# Patient Record
Sex: FEMALE | Race: WHITE | URBAN - METROPOLITAN AREA
[De-identification: names, ages, dates, MRNs, and addresses within clinical notes are randomized per-mention and may not be internally consistent; named-entity substitution may affect disease eponyms.]

---

## 2018-02-27 ENCOUNTER — TRANSFERRED RECORDS (OUTPATIENT)
Dept: HEALTH INFORMATION MANAGEMENT | Facility: CLINIC | Age: 38
End: 2018-02-27

## 2018-02-28 ENCOUNTER — MEDICAL CORRESPONDENCE (OUTPATIENT)
Dept: HEALTH INFORMATION MANAGEMENT | Facility: CLINIC | Age: 38
End: 2018-02-28

## 2018-03-07 ENCOUNTER — CARE COORDINATION (OUTPATIENT)
Dept: SURGERY | Facility: CLINIC | Age: 38
End: 2018-03-07

## 2018-03-07 NOTE — PROGRESS NOTES
Received a referral from Harvinder regarding patient who has a diagnosis from CT of lymphangioma. She is scheduled with Dr. Ybarra 3/16. Dr. Ybarra has reviewed her records and is unsure if a visit is necessary, as nothing is typically done with this diagnosis. He would like to review patients imaging (which has been mailed) prior to the appointment. She may need an IR visit depending on the findings. Called and relayed this information to patient. She is aware the office will notify her with recommendations once the CT has been reviewed.

## 2018-03-09 ENCOUNTER — CARE COORDINATION (OUTPATIENT)
Dept: SURGERY | Facility: CLINIC | Age: 38
End: 2018-03-09

## 2018-03-09 NOTE — PROGRESS NOTES
Called and spoke with patient and informed her that the clinic has not received her imaging. RN has called [facility] and they will Fed Ex a new copy of the Ct. Once this has been reviewed by Dr. Ybarra, she will be called with recommendations. She was appreciative of the call.      Marleni Castañeda Andrea, PATRIZIA        Phone Number: [phone]                     Pavan Beasley,     Patient called wondering if there is an update on reviewing CT Scan results. Have you guys received it yet even? She is hoping to speak with you before the weekend at [phone].     Thanks!   Marleni   [facility] Referrals   [phone]

## 2018-03-12 ENCOUNTER — CARE COORDINATION (OUTPATIENT)
Dept: SURGERY | Facility: CLINIC | Age: 38
End: 2018-03-12

## 2018-03-12 NOTE — PROGRESS NOTES
Dr. Ybarra was able to review patients CT. He states that he is happy to see her but more than likely there is no surgical intervention that he could offer. He states he can speak with IR to see if they can perform sclerotherapy on the area. Sent a message to IR to see the best way for them to review patient images. Called and relayed this information to patient. She would like to cancel her appointment with Dr. Ybarra if he does not feel she is a surgical candidate. She is aware she will either hear back from RN or from IR regarding any further instructions.

## 2018-03-20 ENCOUNTER — CARE COORDINATION (OUTPATIENT)
Dept: SURGERY | Facility: CLINIC | Age: 38
End: 2018-03-20

## 2018-03-20 NOTE — PROGRESS NOTES
Called and spoke with patient and informed her that IR is still reviewing her chart. Provided patient with the IR nurse number so she can call directly for a status update. Patient was appreciative of the information.         From: Roseanne Roger RN      Sent: 3/19/2018   8:10 AM        To: Ramos Plata RN   Subject: IR case                                           Ramos,     This patient left a VM message regarding IR.  She has not yet heard from them and is wondering the status.       Thanks,   Roseanne

## 2018-03-23 ENCOUNTER — TELEPHONE (OUTPATIENT)
Dept: INTERVENTIONAL RADIOLOGY/VASCULAR | Facility: CLINIC | Age: 38
End: 2018-03-23

## 2018-03-23 DIAGNOSIS — Q27.9 VASCULAR MALFORMATION: Primary | ICD-10-CM

## 2018-03-23 NOTE — TELEPHONE ENCOUNTER
I called and left a voicemail including my call back number.  Per Dr. Martínez's request pt needs MRV prior to clinic visit.  I will schedule per pt's preference when she returns my call.  WYATT Jose RN, BSN  Interventional Radiology Care Coordinator   Phone:  [phone]

## 2018-03-23 NOTE — TELEPHONE ENCOUNTER
I called and left a vm to sched pt for MRV and to see Dr. Martínez.  She is [place] per her preference [place] Wed April [place]th [place] [facility] [place]. Martínez at 9 am.  Pt updated and all questions answered.  WYATT Jose RN, BSN  Interventional Radiology Care Coordinator   Phone:  [phone]

## 2018-03-23 NOTE — TELEPHONE ENCOUNTER
Pt scheduled for MRI and clinic consult per her preference.  A letter and map was mailed with appt details.  WYATT Jose RN, BSN  Interventional Radiology Care Coordinator   Phone:  [phone]

## 2018-04-25 ENCOUNTER — RADIANT APPOINTMENT (OUTPATIENT)
Dept: MRI IMAGING | Facility: CLINIC | Age: 38
End: 2018-04-25
Attending: RADIOLOGY
Payer: COMMERCIAL

## 2018-04-25 ENCOUNTER — OFFICE VISIT (OUTPATIENT)
Dept: RADIOLOGY | Facility: CLINIC | Age: 38
End: 2018-04-25
Attending: RADIOLOGY
Payer: COMMERCIAL

## 2018-04-25 VITALS
HEIGHT: 65 IN | HEART RATE: 82 BPM | DIASTOLIC BLOOD PRESSURE: 71 MMHG | TEMPERATURE: 98.1 F | SYSTOLIC BLOOD PRESSURE: 125 MMHG | BODY MASS INDEX: 28.91 KG/M2 | OXYGEN SATURATION: 98 % | WEIGHT: 173.5 LBS | RESPIRATION RATE: 16 BRPM

## 2018-04-25 DIAGNOSIS — Q89.9 LYMPHATIC MALFORMATION: Primary | ICD-10-CM

## 2018-04-25 DIAGNOSIS — Q27.9 VASCULAR MALFORMATION: ICD-10-CM

## 2018-04-25 PROCEDURE — G0463 HOSPITAL OUTPT CLINIC VISIT: HCPCS | Mod: ZF

## 2018-04-25 RX ORDER — GADOBUTROL 604.72 MG/ML
7.5 INJECTION INTRAVENOUS ONCE
Status: COMPLETED | OUTPATIENT
Start: 2018-04-25 | End: 2018-04-25

## 2018-04-25 RX ADMIN — GADOBUTROL 7.5 ML: 604.72 INJECTION INTRAVENOUS at 07:44

## 2018-04-25 ASSESSMENT — PAIN SCALES - GENERAL: PAINLEVEL: NO PAIN (0)

## 2018-04-25 NOTE — LETTER
4/25/2018       RE: Camila Reynolds  [address] [address]     Dear Colleague,    Thank you for referring your patient, Camila Reynolds, to the [facility]. Please see a copy of my visit note below.        INTERVENTIONAL RADIOLOGY CONSULTATION    Name: Camila Reynolds  Age: 37 year old   Referring Physician: Dr. Lu   REASON FOR REFERRAL: vascular malformation    HPI: Mrs. Reynolds is referred by Dr. 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 Her  is with her in clinic today. She was diagnosed as a young child with a vascular lesion affecting the right inguinal region. She underwent resection in approximately 1985 in [place]. She had subsequently done well until the st few years t which time she noticed it has enlarged in the context of pregnancy. Given recurrence of her lesion, surgery was declined. She notes the area overlying the right groin has enlarged to the point that it cases pressure and aching on a daily basis. The discomfort is worsened by physical activity and prolonged standing. Swelling and pain only mildly improves with rest and OTC pain med. No leg swelling, dyspnea, lesions elsewhere on the body, or family history of vascular malformations. ROS otherwise negative.    PAST MEDICAL HISTORY:   No past medical history on file.    PAST SURGICAL HISTORY:   No past surgical history on file.    FAMILY HISTORY:   No family history on file.    SOCIAL HISTORY:   Social History   Substance Use Topics     Smoking status: Never Smoker     Smokeless tobacco: Never Used     Alcohol use Not on file       PROBLEM LIST:   There are no active problems to display for this patient.      MEDICATIONS:   Prescription Medications as of 4/25/2018             order for DME Compression garment measuring from toes all the way up to waist  20-30mmhg    UNABLE TO FIND MEDICATION NAME: birth control (pre-appri)      Facility Administered  "Medications as of 4/25/2018             gadobenate dimeglumine (MULTIHANCE) injection 15 mL Inject 15 mLs into the vein once    gadobutrol (GADAVIST) injection 7.5 mL Inject 7.5 mLs into the vein once          ALLERGIES:   Review of patient's allergies indicates no known allergies.    ROS:  Skin: as above  Eyes: neg  Ears/Nose/Throat: negative  Respiratory: No shortness of breath, cough  Cardiovascular: negative  Gastrointestinal: negative  Musculoskeletal: as above  Neurologic: negative  Psychiatric: anxiety regarding the right groin lesion  Hematologic/Lymphatic/Immunologic: negative      Physical Examination:   VITALS:   /71 (BP Location: Right arm, Patient Position: Chair, Cuff Size: Adult Regular)  Pulse 82  Temp 98.1  F (36.7  C) (Oral)  Resp 16  Ht 1.651 m (5' 5\")  Wt 78.7 kg (173 lb 8 oz)  LMP 04/25/2018  SpO2 98%  BMI 28.87 kg/m2  Constitutional: Healthy, AAO, NAD  Head: Normocephalic.   ENT: Oropharynx clear  Cardiovascular: RRR. No murmur  Respiratory: CTAB  Gastrointestinal: Abdomen soft, non-tender.   : deferred  Musculoskeletal: Healed scar paralleling the right inguinal crease. Prominent blue vessels overlying a 15 cm, soft, compressible lesion right groin. No pulsatility.  Skin: No other vascula stains  Psychiatric: Becomes tearful while discussing her right groin lesion  Vascular: No lower extremity edema    Labs:    BMP RESULTS:  No results found for: NA, POTASSIUM, CHLORIDE, CO2, ANIONGAP, GLC, BUN, CR, GFRESTIMATED, GFRESTBLACK, ROSSY     CBC RESULTS:  No results found for: WBC, RBC, HGB, HCT, MCV, MCH, MCHC, RDW, PLT    INR/PTT:  No results found for: INR, PTT    Diagnostic studies:   MRI 4/25 reviewed by me. Multicystic, T2 hyper intense lesion in the subcutaneous tissues of the right groin. No internal enhancement. No osseous or muscular involvement. C/s macrocystic LM.    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 She is an appropriate candidate for sclerotherapy. I discussed the lifetime nature of these lesions, which can recur following attempted resection, as hers did. The purpose of sclerotherapy is to control symptoms, but it not a cure. She will likely need additional treatments throughout her lifetime. I explained the procedure, including the risks of soft tissue injury, skin ulceration, and disruption of normal lymphatics, all of which are small. She would like to proceed with sclerotherapy. In addition, she should avoid estrogen containing oral contraceptives. A progesterone only option would be better should she need to be on chronic OCPs. I consulted our [facility] gynecologist, Dr. Bhakta, who recommends Nor Q D (aka Aygestin, Florinda, Lyza, Jencycla, Jolivette, Deblitane, Sharobel, Norlyda, Norlyroc, Demetra-BE) one daily.     It was a pleasure to see Mrs. Reynolds and her  in clinic today. Thank you for involving the Interventional Radiology service in her care.    I spent a total of 35 minutes with this patient, over 50% time was for counseling and care coordination.    Angela Martínez MD  Interventional Radiology Attending  [facility]       CC  Patient Care Team:  [facility], [facility] as PCP - Linda Guzman as Referring Physician (Surgery)  Angela Martínez MD as MD (Radiology)  Payton Jose RN as Registered Nurse (Transplant)

## 2018-04-25 NOTE — PROGRESS NOTES
INTERVENTIONAL RADIOLOGY CONSULTATION    Name: Camila Reynolds  Age: 37 year old   Referring Physician: Dr. Lu   REASON FOR REFERRAL: vascular malformation    HPI: Mrs. Reynolds is referred by Dr. 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 Her  is with her in clinic today. She was diagnosed as a young child with a vascular lesion affecting the right inguinal region. She underwent resection in approximately 1985 in [place]. She had subsequently done well until the lsst few years t which time she noticed it has enlarged in the context of pregnancy. Given recurrence of her lesion, surgery was declined. She notes the area overlying the right groin has enlarged to the point that it cases pressure and aching on a daily basis. The discomfort is worsened by physical activity and prolonged standing. Swelling and pain only mildly improves with rest and OTC pain med. No leg swelling, dyspnea, lesions elsewhere on the body, or family history of vascular malformations. ROS otherwise negative.    PAST MEDICAL HISTORY:   No past medical history on file.    PAST SURGICAL HISTORY:   No past surgical history on file.    FAMILY HISTORY:   No family history on file.    SOCIAL HISTORY:   Social History   Substance Use Topics     Smoking status: Never Smoker     Smokeless tobacco: Never Used     Alcohol use Not on file       PROBLEM LIST:   There are no active problems to display for this patient.      MEDICATIONS:   Prescription Medications as of 4/25/2018             order for DME Compression garment measuring from toes all the way up to waist  20-30mmhg    UNABLE TO FIND MEDICATION NAME: birth control (pre-appri)      Facility Administered Medications as of 4/25/2018             gadobenate dimeglumine (MULTIHANCE) injection 15 mL Inject 15 mLs into the vein once    gadobutrol (GADAVIST) injection 7.5 mL Inject 7.5 mLs into the vein once          ALLERGIES:   Review of patient's  "allergies indicates no known allergies.    ROS:  Skin: as above  Eyes: neg  Ears/Nose/Throat: negative  Respiratory: No shortness of breath, cough  Cardiovascular: negative  Gastrointestinal: negative  Musculoskeletal: as above  Neurologic: negative  Psychiatric: anxiety regarding the right groin lesion  Hematologic/Lymphatic/Immunologic: negative      Physical Examination:   VITALS:   /71 (BP Location: Right arm, Patient Position: Chair, Cuff Size: Adult Regular)  Pulse 82  Temp 98.1  F (36.7  C) (Oral)  Resp 16  Ht 1.651 m (5' 5\")  Wt 78.7 kg (173 lb 8 oz)  LMP 04/25/2018  SpO2 98%  BMI 28.87 kg/m2  Constitutional: Healthy, AAO, NAD  Head: Normocephalic.   ENT: Oropharynx clear  Cardiovascular: RRR. No murmur  Respiratory: CTAB  Gastrointestinal: Abdomen soft, non-tender.   : deferred  Musculoskeletal: Healed scar paralleling the right inguinal crease. Prominent blue vessels overlying a 15 cm, soft, compressible lesion right groin. No pulsatility.  Skin: No other vascula stains  Psychiatric: Becomes tearful while discussing her right groin lesion  Vascular: No lower extremity edema    Labs:    BMP RESULTS:  No results found for: NA, POTASSIUM, CHLORIDE, CO2, ANIONGAP, GLC, BUN, CR, GFRESTIMATED, GFRESTBLACK, ROSSY     CBC RESULTS:  No results found for: WBC, RBC, HGB, HCT, MCV, MCH, MCHC, RDW, PLT    INR/PTT:  No results found for: INR, PTT    Diagnostic studies:   MRI 4/25 reviewed by me. Multicystic, T2 hyper intense lesion in the subcutaneous tissues of the right groin. No internal enhancement. No osseous or muscular involvement. C/s macrocystic LM.    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 She is an appropriate candidate for sclerotherapy. I discussed the lifetime nature of these lesions, which can recur following attempted resection, as hers did. The purpose " of sclerotherapy is to control symptoms, but it not a cure. She will likely need additional treatments throughout her lifetime. I explained the procedure, including the risks of soft tissue injury, skin ulceration, and disruption of normal lymphatics, all of which are small. She would like to proceed with sclerotherapy. In addition, she should avoid estrogen containing oral contraceptives. A progesterone only option would be better should she need to be on chronic OCPs. I consulted our [facility] gynecologist, Dr. Bhakta, who recommends Nor Q D (aka Aygestin, Florinda, Lyza, Jencycla, Jolivette, Deblitane, Sharobel, Norlyda, Norlyroc, Demetra-BE) one daily.     It was a pleasure to see Mrs. Reynolds and her  in clinic today. Thank you for involving the Interventional Radiology service in her care.    I spent a total of 35 minutes with this patient, over 50% time was for counseling and care coordination.    Angela Martínez MD  Interventional Radiology Attending  [facility]       CC  Patient Care Team:  [facility] as PCP - General  Vishnu Lu as Referring Physician (Surgery)  Angela Martínez MD as MD (Radiology)  Payton Jose, RN as Registered Nurse (Transplant)  VISHNU LU

## 2018-04-25 NOTE — PATIENT INSTRUCTIONS
We will call you with the appt details of your procedure.    We may also need to obtain a Prior Authorization regarding this treatment procedure which may take up to 14 days as well. Once obtained then we will all you with that information and move forward to schedule.     We will also then send a script for you to obtain your compression garment as well.       It was a pleasure to see you in clinic today   Should you have any other questions please feel free to call us any time.     Lillie Carpenter RN, BSN  Interventional Radiology Nurse Coordinator   Phone: [phone]      Covering for Carlee Lennon RN   [phone]

## 2018-06-04 ENCOUNTER — TELEPHONE (OUTPATIENT)
Dept: INTERVENTIONAL RADIOLOGY/VASCULAR | Facility: CLINIC | Age: 38
End: 2018-06-04

## 2018-06-04 DIAGNOSIS — Q27.9 VASCULAR MALFORMATION: Primary | ICD-10-CM

## 2018-06-04 NOTE — LETTER
June 4, 2018    Camila Reynolds  [address] [place] [address]    Dear Camila,     Monday, July 16th @ 8:30 am, please check in at 7:00 am.  Please check-in to the gold waiting room at the [facility], located at [address] [place] [address]. They do have  parking.    *Please have nothing to eat for 6 hours prior to the procedure time   *You may have clear liquids (black coffee, water, broth) up to 2 hours prior to the procedure time  *You will need a ride home after the procedure  *Plan to spend an estimated 6 hours in the hospital on the day of your procedure    I will call you after your procedure to see how you are doing, and to schedule any follow up that is recommended by Dr. Martínez.    SHAILESH:  Sclerotherapy is a non-surgical procedure that uses ultrasound guidance to treat abnormal vessels (vascular malformations). This procedure can be used on abnormal vessels in many parts of the body. While you are under sedation, Dr. Martínez will inject a chemical (sclerosant) into the abnormal vessels that causes then to clot and become inflamed and irritated. This process causes pain and swelling in the treatment area, but the intent is the treated vessels will no longer fill with blood/fluid and scar down causing shrinkage in the vascular malformation and symptom improvement. This is rarely curative, but does improve symptoms. Lesions may require multiple treatments to achieve good symptom control. After the treatment, you need to be prepared to rest (no vigorous activity x 5 days) and you may need to use crutches if the malformation is in the leg. You will also have to keep a compression dressing applied to the site. Typically, pain is worst from day 1 to day 5, then gradually improves. Pain is typically well controlled with Ibuprofen only, but additional pain medications can be provided if needed.     Please feel free to call me with any questions or concerns as your  procedure approaches.    Carlee Jose RN, BSN  Interventional Radiology Care Coordinator  Office: [phone]

## 2018-07-12 ENCOUNTER — TELEPHONE (OUTPATIENT)
Dept: INTERVENTIONAL RADIOLOGY/VASCULAR | Facility: CLINIC | Age: 38
End: 2018-07-12

## 2018-07-16 ENCOUNTER — APPOINTMENT (OUTPATIENT)
Dept: INTERVENTIONAL RADIOLOGY/VASCULAR | Facility: CLINIC | Age: 38
End: 2018-07-16
Attending: RADIOLOGY
Payer: COMMERCIAL

## 2018-07-16 ENCOUNTER — HOSPITAL ENCOUNTER (OUTPATIENT)
Facility: CLINIC | Age: 38
Discharge: HOME | End: 2018-07-16
Attending: RADIOLOGY | Admitting: RADIOLOGY
Payer: COMMERCIAL

## 2018-07-16 ENCOUNTER — APPOINTMENT (OUTPATIENT)
Dept: MEDSURG UNIT | Facility: CLINIC | Age: 38
End: 2018-07-16
Attending: RADIOLOGY
Payer: COMMERCIAL

## 2018-07-16 VITALS
DIASTOLIC BLOOD PRESSURE: 73 MMHG | OXYGEN SATURATION: 99 % | BODY MASS INDEX: 28.91 KG/M2 | HEART RATE: 74 BPM | WEIGHT: 173.5 LBS | HEIGHT: 65 IN | RESPIRATION RATE: 16 BRPM | TEMPERATURE: 98.2 F | SYSTOLIC BLOOD PRESSURE: 119 MMHG

## 2018-07-16 DIAGNOSIS — Q27.9 VASCULAR MALFORMATION: ICD-10-CM

## 2018-07-16 LAB
ANION GAP SERPL CALCULATED.3IONS-SCNC: 8 MMOL/L (ref 3–14)
B-HCG SERPL-ACNC: <1 IU/L (ref 0–5)
BUN SERPL-MCNC: 19 MG/DL (ref 7–30)
CALCIUM SERPL-MCNC: 8.4 MG/DL (ref 8.5–10.1)
CHLORIDE SERPL-SCNC: 110 MMOL/L (ref 94–109)
CO2 SERPL-SCNC: 23 MMOL/L (ref 20–32)
CREAT SERPL-MCNC: 0.82 MG/DL (ref 0.52–1.04)
ERYTHROCYTE [DISTWIDTH] IN BLOOD BY AUTOMATED COUNT: 12.8 % (ref 10–15)
GFR SERPL CREATININE-BSD FRML MDRD: 78 ML/MIN/1.7M2
GLUCOSE SERPL-MCNC: 84 MG/DL (ref 70–99)
HCT VFR BLD AUTO: 38.5 % (ref 35–47)
HGB BLD-MCNC: 12.7 G/DL (ref 11.7–15.7)
INR PPP: 0.99 (ref 0.86–1.14)
MCH RBC QN AUTO: 28.7 PG (ref 26.5–33)
MCHC RBC AUTO-ENTMCNC: 33 G/DL (ref 31.5–36.5)
MCV RBC AUTO: 87 FL (ref 78–100)
PLATELET # BLD AUTO: 199 10E9/L (ref 150–450)
POTASSIUM SERPL-SCNC: 3.8 MMOL/L (ref 3.4–5.3)
RBC # BLD AUTO: 4.43 10E12/L (ref 3.8–5.2)
SODIUM SERPL-SCNC: 141 MMOL/L (ref 133–144)
WBC # BLD AUTO: 5.8 10E9/L (ref 4–11)

## 2018-07-16 PROCEDURE — 25000125 ZZHC RX 250: Performed by: RADIOLOGY

## 2018-07-16 PROCEDURE — 85610 PROTHROMBIN TIME: CPT | Performed by: RADIOLOGY

## 2018-07-16 PROCEDURE — 80048 BASIC METABOLIC PNL TOTAL CA: CPT | Performed by: RADIOLOGY

## 2018-07-16 PROCEDURE — 27210732 ZZH ACCESSORY CR1

## 2018-07-16 PROCEDURE — 85027 COMPLETE CBC AUTOMATED: CPT | Performed by: RADIOLOGY

## 2018-07-16 PROCEDURE — 99153 MOD SED SAME PHYS/QHP EA: CPT

## 2018-07-16 PROCEDURE — 25000128 H RX IP 250 OP 636: Performed by: PHYSICIAN ASSISTANT

## 2018-07-16 PROCEDURE — 25000128 H RX IP 250 OP 636: Performed by: RADIOLOGY

## 2018-07-16 PROCEDURE — 40000166 ZZH STATISTIC PP CARE STAGE 1

## 2018-07-16 PROCEDURE — 37241 VASC EMBOLIZE/OCCLUDE VENOUS: CPT

## 2018-07-16 PROCEDURE — 84702 CHORIONIC GONADOTROPIN TEST: CPT | Performed by: RADIOLOGY

## 2018-07-16 PROCEDURE — 27210903 ZZH KIT CR5

## 2018-07-16 PROCEDURE — 99152 MOD SED SAME PHYS/QHP 5/>YRS: CPT

## 2018-07-16 RX ORDER — OXYCODONE HYDROCHLORIDE 5 MG/1
5-10 TABLET ORAL EVERY 4 HOURS PRN
Qty: 10 TABLET | Refills: 0 | Status: SHIPPED | OUTPATIENT
Start: 2018-07-16

## 2018-07-16 RX ORDER — DOXYCYCLINE 100 MG/10ML
50 INJECTION, POWDER, LYOPHILIZED, FOR SOLUTION INTRAVENOUS ONCE
Status: COMPLETED | OUTPATIENT
Start: 2018-07-16 | End: 2018-07-16

## 2018-07-16 RX ORDER — CEFAZOLIN SODIUM 2 G/100ML
2 INJECTION, SOLUTION INTRAVENOUS
Status: COMPLETED | OUTPATIENT
Start: 2018-07-16 | End: 2018-07-16

## 2018-07-16 RX ORDER — IODIXANOL 320 MG/ML
50 INJECTION, SOLUTION INTRAVASCULAR ONCE
Status: COMPLETED | OUTPATIENT
Start: 2018-07-16 | End: 2018-07-16

## 2018-07-16 RX ORDER — SODIUM CHLORIDE 9 MG/ML
INJECTION, SOLUTION INTRAVENOUS CONTINUOUS
Status: DISCONTINUED | OUTPATIENT
Start: 2018-07-16 | End: 2018-07-16 | Stop reason: HOSPADM

## 2018-07-16 RX ORDER — NALOXONE HYDROCHLORIDE 0.4 MG/ML
.1-.4 INJECTION, SOLUTION INTRAMUSCULAR; INTRAVENOUS; SUBCUTANEOUS
Status: DISCONTINUED | OUTPATIENT
Start: 2018-07-16 | End: 2018-07-16 | Stop reason: HOSPADM

## 2018-07-16 RX ORDER — FLUMAZENIL 0.1 MG/ML
0.2 INJECTION, SOLUTION INTRAVENOUS
Status: DISCONTINUED | OUTPATIENT
Start: 2018-07-16 | End: 2018-07-16 | Stop reason: HOSPADM

## 2018-07-16 RX ORDER — LIDOCAINE 40 MG/G
CREAM TOPICAL
Status: DISCONTINUED | OUTPATIENT
Start: 2018-07-16 | End: 2018-07-16 | Stop reason: HOSPADM

## 2018-07-16 RX ORDER — FENTANYL CITRATE 50 UG/ML
25-50 INJECTION, SOLUTION INTRAMUSCULAR; INTRAVENOUS EVERY 5 MIN PRN
Status: DISCONTINUED | OUTPATIENT
Start: 2018-07-16 | End: 2018-07-16 | Stop reason: HOSPADM

## 2018-07-16 RX ORDER — KETOROLAC TROMETHAMINE 30 MG/ML
15 INJECTION, SOLUTION INTRAMUSCULAR; INTRAVENOUS EVERY 6 HOURS PRN
Status: DISCONTINUED | OUTPATIENT
Start: 2018-07-16 | End: 2018-07-16 | Stop reason: HOSPADM

## 2018-07-16 RX ADMIN — MIDAZOLAM HYDROCHLORIDE 2 MG: 2 INJECTION, SOLUTION INTRAMUSCULAR; INTRAVENOUS at 09:48

## 2018-07-16 RX ADMIN — DOXYCYCLINE 50 ML: 100 INJECTION, POWDER, LYOPHILIZED, FOR SOLUTION INTRAVENOUS at 09:48

## 2018-07-16 RX ADMIN — CEFAZOLIN SODIUM 2 G: 2 INJECTION, SOLUTION INTRAVENOUS at 07:51

## 2018-07-16 RX ADMIN — FENTANYL CITRATE 50 MCG: 50 INJECTION, SOLUTION INTRAMUSCULAR; INTRAVENOUS at 10:11

## 2018-07-16 RX ADMIN — MIDAZOLAM HYDROCHLORIDE 1 MG: 2 INJECTION, SOLUTION INTRAMUSCULAR; INTRAVENOUS at 10:11

## 2018-07-16 RX ADMIN — KETOROLAC TROMETHAMINE 15 MG: 30 INJECTION, SOLUTION INTRAMUSCULAR at 10:11

## 2018-07-16 RX ADMIN — FENTANYL CITRATE 100 MCG: 50 INJECTION, SOLUTION INTRAMUSCULAR; INTRAVENOUS at 09:47

## 2018-07-16 RX ADMIN — IODIXANOL 8 ML: 320 INJECTION, SOLUTION INTRAVASCULAR at 10:22

## 2018-07-16 RX ADMIN — LIDOCAINE HYDROCHLORIDE 5 ML: 10 INJECTION, SOLUTION EPIDURAL; INFILTRATION; INTRACAUDAL; PERINEURAL at 09:49

## 2018-07-16 RX ADMIN — SODIUM CHLORIDE: 9 INJECTION, SOLUTION INTRAVENOUS at 07:50

## 2018-07-16 NOTE — DISCHARGE INSTRUCTIONS
[facility]  Going Home after Sedation      For 24 hours:    An adult should stay with you.    Relax and take it easy.    DO NOT make any important legal decisions.    DO NOT drive or operate machines at home or at work.    Resume your regular diet and drink plenty of fluids.    CALL THE PHYSICIAN IF:    You develop nausea or vomiting    You develop hives or a rash or any unexplained itching    No tub baths or swimming for 5 days, you may shower tomorrow.    No heavy lifting for 1 week.    ADDITIONAL INSTRUCTIONS:Ice to the site every four hours as needed for pain or swelling    [facility] INTERVENTIONAL RADIOLOGY DEPARTMENT        Procedure Physician:  MATILDE Martínez MD                               Date of procedure:July 16, 2018        Telephone numbers:     [phone]      Monday-Friday 8:00 am to 4:30 pm                                              [phone]       After 4:30 pm Monday-Friday, Weekends & Holidays. Ask for the Interventional Radiologist on call. Someone is  available 24 hours/day        [facility] toll free number: [phone]  Monday-Friday 8:00 am to 4:30 pm

## 2018-07-16 NOTE — H&P
H&P  Genereal: Alert, oriented  Lungs:Clear to auscultation  Heart: RRR, no murmurs or gallops  Abdomen:Soft, nontender

## 2018-07-16 NOTE — PROGRESS NOTES
Patient tolerated recovery stage well. VSS, R groin site clean/dry/intact, no hematoma, and denies pain at this time. Ice pack to the site. Patient tolerated PO food and fluids. Teaching was done and discharge instructions were given. Patient ambulated, voided, and PIV was removed. Patient discharged from the hospital via wheel chair to home with .

## 2018-07-16 NOTE — PROGRESS NOTES
Interventional Radiology Brief Post Procedure Note    Procedure:     Proceduralist: Dr. Martínez    Assistant: IR Fellow Physician, Jerson Stevenson and None    Time Out: Prior to the start of the procedure and with procedural staff participation, I verbally confirmed the patient s identity using two indicators, relevant allergies, that the procedure was appropriate and matched the consent or emergent situation, and that the correct equipment/implants were available. Immediately prior to starting the procedure I conducted the Time Out with the procedural staff and re-confirmed the patient s name, procedure, and site/side. (The Joint Commission universal protocol was followed.)  Yes        Sedation: IR Nurse Monitored Care   Post Procedure Summary:  Prior to the start of the procedure and with procedural staff participation, I verbally confirmed the patient s identity using two indicators, relevant allergies, that the procedure was appropriate and matched the consent or emergent situation, and that the correct equipment/implants were available. Immediately prior to starting the procedure I conducted the Time Out with the procedural staff and re-confirmed the patient s name, procedure, and site/side. (The Joint Commission universal protocol was followed.)  Yes       Sedatives: Fentanyl and Midazolam (Versed)    Vital signs, airway and pulse oximetry were monitored and remained stable throughout the procedure and sedation was maintained until the procedure was complete.  The patient was monitored by staff until sedation discharge criteria were met.    Patient tolerance: Patient tolerated the procedure well with no immediate complications.    Time of sedation in minutes: 60 Minutes minutes from beginning to end of physician one to one monitoring.          Findings: Successful sclerotherapy for right inguinal lymphatic malformation.    Estimated Blood Loss: None    Fluoroscopy Time:  minute(s)    SPECIMENS:  None    Complications: 1. None     Condition: Stable    Plan: Send to 2A to observe for 2 hours    Comments: See dictated procedure note for full details.    Jerson Stevenson MD

## 2018-07-16 NOTE — PROGRESS NOTES
Interventional Radiology Pre-Procedure Sedation Assessment   Time of Assessment: 7:54 AM    Expected Level: Moderate Sedation    Indication: Sedation is required for the following type of Procedure: Lymphatic malformation sclerotherapy    Sedation and procedural consent: Risks, benefits and alternatives were discussed with Patient    PO Intake: Appropriately NPO for procedure    ASA Class: Class 1 - HEALTHY PATIENT    Mallampati: Grade 1:  Soft palate, uvula, tonsillar pillars, and posterior pharyngeal wall visible    Lungs: Lungs Clear with good breath sounds bilaterally    Heart: Normal heart sounds and rate    Focused history and physical completed prior to procedure. I have reviewed the lab findings, diagnostic data, medications, and the plan for sedation. I have determined this patient to be an appropriate candidate for the planned sedation and procedure and have reassessed the patient IMMEDIATELY PRIOR to sedation and procedure.    Jerson Stevenson MD

## 2018-07-16 NOTE — IP AVS SNAPSHOT
[address] [address]                                       After Visit Summary   7/16/2018    Camila Reynolds    MRN: [id number]           After Visit Summary Signature Page     I have received my discharge instructions, and my questions have been answered. I have discussed any challenges I see with this plan with the nurse or doctor.    ..........................................................................................................................................  Patient/Patient Representative Signature      ..........................................................................................................................................  Patient Representative Print Name and Relationship to Patient    ..................................................               ................................................  Date                                            Time    ..........................................................................................................................................  Reviewed by Signature/Title    ...................................................              ..............................................  Date                                                            Time

## 2018-07-16 NOTE — PROGRESS NOTES
Pt arrived to floor from IR with RN s/p sclerotherapy of right groin. VSS  Right groin site CDI, no hematoma. Pt denies pain.  at bedside. Plan dc @ noon per MD orders.

## 2018-07-16 NOTE — PROGRESS NOTES
Patient is ready for the procedure, here or sclerotherapy, Blood work pending.  with her Consent signed.

## 2018-07-16 NOTE — IP AVS SNAPSHOT
MRN:[id number]                      After Visit Summary   7/16/2018    Camila Reynolds    MRN: [id number]           Visit Information        Department      7/16/2018  7:03 AM [address] [facility] [place]          Review of your medicines      UNREVIEWED medicines. Ask your doctor about these medicines        Dose / Directions    IBUPROFEN PO        Dose:  400 mg   Take 400 mg by mouth every 6 hours as needed for moderate pain   Refills:  0       UNABLE TO FIND        MEDICATION NAME: birth control (pre-appri)   Refills:  0         START taking        Dose / Directions    oxyCODONE IR 5 MG tablet   Commonly known as:  ROXICODONE   Used for:  Vascular malformation        Dose:  5-10 mg   Take 1-2 tablets (5-10 mg) by mouth every 4 hours as needed for moderate to severe pain   Quantity:  10 tablet   Refills:  0         CONTINUE these medicines which have NOT CHANGED        Dose / Directions    order for DME   Used for:  Lymphatic malformation        Compression garment measuring from toes all the way up to waist 20-30mmhg   Quantity:  1 each   Refills:  3            Where to get your medicines      Some of these will need a paper prescription and others can be bought over the counter. Ask your nurse if you have questions.     Bring a paper prescription for each of these medications     oxyCODONE IR 5 MG tablet               Prescriptions were sent or printed at these locations (1 Prescription)                   Other Prescriptions                Printed at Department/Unit printer (1 of 1)         oxyCODONE IR (ROXICODONE) 5 MG tablet                 Protect others around you: Learn how to safely use, store and throw away your medicines at www.disposemymeds.org.         Follow-ups after your visit         Care Instructions        After Care Instructions     Discharge Instructions       No submerging procedure site under water for 7 days.  Observe carefully for any blistering or skin ulceration.             Discharge Instructions       Keep skin clean and dry.            Discharge Instructions       Cold packs to be used for 3 days followed by warm packs.  Apply cold packs every 4 hours for 20 minutes.            Discharge Instructions       No strenuous activity for 5 days.            Discharge Instructions       Compression dressing to site at home as able.  May remove for bathing.                  Further instructions from your care team       [facility]  Going Home after Sedation      For 24 hours:    An adult should stay with you.    Relax and take it easy.    DO NOT make any important legal decisions.    DO NOT drive or operate machines at home or at work.    Resume your regular diet and drink plenty of fluids.    CALL THE PHYSICIAN IF:    You develop nausea or vomiting    You develop hives or a rash or any unexplained itching    No tub baths or swimming for 5 days, you may shower tomorrow.    No heavy lifting for 1 week.    ADDITIONAL INSTRUCTIONS:Ice to the site every four hours as needed for pain or swelling    [facility] INTERVENTIONAL RADIOLOGY DEPARTMENT        Procedure Physician:  MATILDE Martínez MD                               Date of procedure:July 16, 2018        Telephone numbers:     [phone]      Monday-Friday 8:00 am to 4:30 pm                                              [phone]       After 4:30 pm Monday-Friday, Weekends & Holidays. Ask for the Interventional Radiologist on call. Someone is  available 24 hours/day        [facility] toll free number: [phone]  Monday-Friday 8:00 am to 4:30 pm                                                                                                                                                                                                                       Information about OPIOIDS     PRESCRIPTION OPIOIDS: WHAT YOU NEED TO KNOW   We gave you an opioid (narcotic) pain medicine. It is important to manage your pain, but opioids are  not always the best choice. You should first try all the other options your care team gave you. Take this medicine for as short a time (and as few doses) as possible.     These medicines have risks:    DO NOT drive when on new or higher doses of pain medicine. These medicines can affect your alertness and reaction times, and you could be arrested for driving under the influence (DUI). If you need to use opioids long-term, talk to your care team about driving.    DO NOT operate heave machinery    DO NOT do any other dangerous activities while taking these medicines.     DO NOT drink any alcohol while taking these medicines.      If the opioid prescribed includes acetaminophen, DO NOT take with any other medicines that contain acetaminophen. Read all labels carefully. Look for the word  acetaminophen  or  Tylenol.  Ask your pharmacist if you have questions or are unsure.    You can get addicted to pain medicines, especially if you have a history of addiction (chemical, alcohol or substance dependence). Talk to your care team about ways to reduce this risk.    Store your pills in a secure place, locked if possible. We will not replace any lost or stolen medicine. If you don t finish your medicine, please throw away (dispose) as directed by your pharmacist. The [place] [employer]ution [employer] has more information about safe disposal: https://www.pca.[facility].[place]/living-green/managing-unwanted-medications.     All opioids tend to cause constipation. Drink plenty of water and eat foods that have a lot of fiber, such as fruits, vegetables, prune juice, apple juice and high-fiber cereal. Take a laxative (Miralax, milk of magnesia, Colace, Senna) if you don t move your bowels at least every other day.          Additional Information About Your Visit        MyChart Information     [employer] lets you send messages to your doctor, view your test results, renew your prescriptions, schedule appointments and more. To sign up, go  "to [email or web address] . Click on \"Log in\" on the left side of the screen, which will take you to the Welcome page. Then click on \"Sign up Now\" on the right side of the page.     You will be asked to enter the access code listed below, as well as some personal information. Please follow the directions to create your username and password.     Your access code is: [email or web address]  Expires: 10/14/2018 11:33 AM     Your access code will  in 90 days. If you need help or a new code, please call your [facility] clinic or [phone].        Care EveryWhere ID     This is your Care EveryWhere ID. This could be used by other organizations to access your [facility] medical records  [id number]        Your Vitals Were     Blood Pressure Pulse Temperature Respirations Height Weight    95/51 (BP Location: Right arm) 74 98.2  F (36.8  C) (Oral) 16 1.651 m (5' 5\") 78.7 kg (173 lb 8 oz)    Pulse Oximetry BMI (Body Mass Index)                99% 28.87 kg/m2           Primary Care Provider Office Phone # Fax #    Berenice Padilla [phone]      Equal Access to Services     RASHAD SANCHEZ : Hadii rupinder ku hadasho Soomaali, waaxda luqadaha, qaybta kaalmada adeegyada, waxay nilesh horta . So [facility] [phone].    ATENCIÓN: Si habla español, tiene a rey disposición servicios gratuitos de asistencia lingüística. Llame al [phone].    We comply with applicable federal civil rights laws and [place] laws. We do not discriminate on the basis of race, color, national origin, age, disability, sex, sexual orientation, or gender identity.            Thank you!     Thank you for choosing [facility] for your care. Our goal is always to provide you with excellent care. Hearing back from our patients is one way we can continue to improve our services. Please take a few minutes to complete the written survey that you may receive in the mail after you visit with us. Thank you!             Medication List: " This is a list of all your medications and when to take them. Check marks below indicate your daily home schedule. Keep this list as a reference.      Medications           Morning Afternoon Evening Bedtime As Needed    IBUPROFEN PO   Take 400 mg by mouth every 6 hours as needed for moderate pain                                order for DME   Compression garment measuring from toes all the way up to waist 20-30mmhg                                oxyCODONE IR 5 MG tablet   Commonly known as:  ROXICODONE   Take 1-2 tablets (5-10 mg) by mouth every 4 hours as needed for moderate to severe pain                                UNABLE TO FIND   MEDICATION NAME: birth control (pre-appri)

## 2018-07-19 ENCOUNTER — TELEPHONE (OUTPATIENT)
Dept: RADIOLOGY | Facility: CLINIC | Age: 38
End: 2018-07-19

## 2018-07-19 NOTE — TELEPHONE ENCOUNTER
I called and left a voicemail including my call back number.  I called to see how she was doing post sclerotherapy tx with Dr. Martínez, and to give recommended f/up in 2 months.  WYATT Jose RN, BSN  Interventional Radiology Care Coordinator   Phone:  [phone]

## 2018-07-25 ENCOUNTER — HEALTH MAINTENANCE LETTER (OUTPATIENT)
Age: 38
End: 2018-07-25

## 2020-03-11 ENCOUNTER — HEALTH MAINTENANCE LETTER (OUTPATIENT)
Age: 40
End: 2020-03-11

## 2021-01-03 ENCOUNTER — HEALTH MAINTENANCE LETTER (OUTPATIENT)
Age: 41
End: 2021-01-03

## 2021-04-25 ENCOUNTER — HEALTH MAINTENANCE LETTER (OUTPATIENT)
Age: 41
End: 2021-04-25

## 2021-10-10 ENCOUNTER — HEALTH MAINTENANCE LETTER (OUTPATIENT)
Age: 41
End: 2021-10-10

## 2022-05-21 ENCOUNTER — HEALTH MAINTENANCE LETTER (OUTPATIENT)
Age: 42
End: 2022-05-21

## 2022-07-06 NOTE — MR AVS SNAPSHOT
After Visit Summary   4/25/2018    Camila Reynolds    MRN: [id number]           Patient Information     Date Of Birth          1980        Visit Information        Provider Department      4/25/2018 9:00 AM Angela Martínez MD [facility]        Care Instructions    We will call you with the appt details of your procedure.    We may also need to obtain a Prior Authorization regarding this treatment procedure which may take up to 14 days as well. Once obtained then we will all you with that information and move forward to schedule.     We will also then send a script for you to obtain your compression garment as well.       It was a pleasure to see you in clinic today   Should you have any other questions please feel free to call us any time.     Lillie Carpenter RN, BSN  Interventional Radiology Nurse Coordinator   Phone: [phone]      Covering for Carlee Lennon RN   [phone]          Follow-ups after your visit        Who to contact     If you have questions or need follow up information about today's clinic visit or your schedule please contact [facility] directly at [phone].  Normal or non-critical lab and imaging results will be communicated to you by MyChart, letter or phone within 4 business days after the clinic has received the results. If you do not hear from us within 7 days, please contact the clinic through [employer] or phone. If you have a critical or abnormal lab result, we will notify you by phone as soon as possible.  Submit refill requests through [employer] or call your pharmacy and they will forward the refill request to us. Please allow 3 business days for your refill to be completed.          Additional Information About Your Visit        [employer] Information     [employer] lets you send messages to your doctor, view your test results, renew your prescriptions, schedule appointments and more. To sign up, go to  "[email or web address] . Click on \"Log in\" on the left side of the screen, which will take you to the Welcome page. Then click on \"Sign up Now\" on the right side of the page.     You will be asked to enter the access code listed below, as well as some personal information. Please follow the directions to create your username and password.     Your access code is: [email or web address]  Expires: 2018  7:30 AM     Your access code will  in 90 days. If you need help or a new code, please call your [facility] clinic or [phone].        Care EveryWhere ID     This is your Care EveryWhere ID. This could be used by other organizations to access your [facility] medical records  [id number]        Your Vitals Were     Pulse Temperature Respirations Height Last Period Pulse Oximetry    82 98.1  F (36.7  C) (Oral) 16 1.651 m (5' 5\") 2018 98%    BMI (Body Mass Index)                   28.87 kg/m2            Blood Pressure from Last 3 Encounters:   18 125/71    Weight from Last 3 Encounters:   18 78.7 kg (173 lb 8 oz)              Today, you had the following     No orders found for display       Primary Care Provider Office Phone # Fax #    [facility] [phone]       [address] [place] [address]        Equal Access to Services     RASHAD SANCHEZ : Hadii rupinder ku hadasho Sogregorioali, waaxda luqadaha, qaybta kaalmada adeegyada, belgica horta . So [facility] [phone].    ATENCIÓN: Si habla español, tiene a rey disposición servicios gratuitos de asistencia lingüística. Bunny al [phone].    We comply with applicable federal civil rights laws and [place] laws. We do not discriminate on the basis of race, color, national origin, age, disability, sex, sexual orientation, or gender identity.            Thank you!     Thank you for choosing [facility]  for your care. Our goal is always to provide you with excellent care. Hearing " back from our patients is one way we can continue to improve our services. Please take a few minutes to complete the written survey that you may receive in the mail after your visit with us. Thank you!             Your Updated Medication List - Protect others around you: Learn how to safely use, store and throw away your medicines at www.disposemymeds.org.          This list is accurate as of 4/25/18 10:08 AM.  Always use your most recent med list.                   Brand Name Dispense Instructions for use Diagnosis    UNABLE TO FIND      MEDICATION NAME: birth control (pre-appri)           Home

## 2022-09-18 ENCOUNTER — HEALTH MAINTENANCE LETTER (OUTPATIENT)
Age: 42
End: 2022-09-18

## 2023-06-04 ENCOUNTER — HEALTH MAINTENANCE LETTER (OUTPATIENT)
Age: 43
End: 2023-06-04

## 2024-02-25 ENCOUNTER — HEALTH MAINTENANCE LETTER (OUTPATIENT)
Age: 44
End: 2024-02-25

## (undated) RX ORDER — KETOROLAC TROMETHAMINE 30 MG/ML
INJECTION, SOLUTION INTRAMUSCULAR; INTRAVENOUS
Status: DISPENSED
Start: 2018-07-16

## (undated) RX ORDER — FENTANYL CITRATE 50 UG/ML
INJECTION, SOLUTION INTRAMUSCULAR; INTRAVENOUS
Status: DISPENSED
Start: 2018-07-16

## (undated) RX ORDER — CEFAZOLIN SODIUM 2 G/100ML
INJECTION, SOLUTION INTRAVENOUS
Status: DISPENSED
Start: 2018-07-16

## (undated) RX ORDER — LIDOCAINE HYDROCHLORIDE 10 MG/ML
INJECTION, SOLUTION EPIDURAL; INFILTRATION; INTRACAUDAL; PERINEURAL
Status: DISPENSED
Start: 2018-07-16

## (undated) RX ORDER — SODIUM CHLORIDE 9 MG/ML
INJECTION, SOLUTION INTRAVENOUS
Status: DISPENSED
Start: 2018-07-16

## (undated) RX ORDER — SODIUM TETRADECYL SULFATE 30 MG/ML
INJECTION, SOLUTION INTRAVENOUS
Status: DISPENSED
Start: 2018-07-16